# Patient Record
Sex: FEMALE | Race: WHITE | Employment: UNEMPLOYED | URBAN - METROPOLITAN AREA
[De-identification: names, ages, dates, MRNs, and addresses within clinical notes are randomized per-mention and may not be internally consistent; named-entity substitution may affect disease eponyms.]

---

## 2017-02-15 ENCOUNTER — HOSPITAL ENCOUNTER (OUTPATIENT)
Dept: LAB | Age: 52
Discharge: HOME | End: 2017-02-15

## 2017-02-15 ENCOUNTER — OFFICE VISIT (OUTPATIENT)
Dept: HEMATOLOGY | Age: 52
End: 2017-02-15

## 2017-02-15 VITALS
TEMPERATURE: 96.7 F | SYSTOLIC BLOOD PRESSURE: 131 MMHG | RESPIRATION RATE: 16 BRPM | DIASTOLIC BLOOD PRESSURE: 84 MMHG | WEIGHT: 167 LBS | OXYGEN SATURATION: 95 % | BODY MASS INDEX: 26.21 KG/M2 | HEIGHT: 67 IN | HEART RATE: 60 BPM

## 2017-02-15 DIAGNOSIS — B18.2 CHRONIC HEPATITIS C WITHOUT HEPATIC COMA (HCC): Primary | ICD-10-CM

## 2017-02-15 PROCEDURE — 99001 SPECIMEN HANDLING PT-LAB: CPT | Performed by: NURSE PRACTITIONER

## 2017-02-15 NOTE — MR AVS SNAPSHOT
Visit Information Date & Time Provider Department Dept. Phone Encounter #  
 2/15/2017 10:00 AM Kathy Moreira NP [facility] [address] [place] [address] [id number] Upcoming Health Maintenance Date Due Pneumococcal 19-64 Medium Risk (1 of 1 - PPSV23) 5/27/1984 DTaP/Tdap/Td series (1 - Tdap) 5/27/1986 PAP AKA CERVICAL CYTOLOGY 5/27/1986 BREAST CANCER SCRN MAMMOGRAM 5/27/2015 FOBT Q 1 YEAR AGE 50-75 5/27/2015 INFLUENZA AGE 9 TO ADULT 8/1/2016 Allergies as of 2/15/2017  Review Complete On: 2/15/2017 By: Mariya Connelly Severity Noted Reaction Type Reactions Biaxin [Clarithromycin] High 05/06/2016   Systemic Hives, Shortness of Breath, Itching Other Plant, Animal, Environmental High 08/05/2016    Anaphylaxis Bees and most insects-pt has an epipen Cleocin [Clindamycin Hcl]  02/03/2016    Palpitations Current Immunizations  Never Reviewed No immunizations on file. Not reviewed this visit You Were Diagnosed With   
  
 Codes Comments Chronic hepatitis C without hepatic coma (HCC)    -  Primary ICD-10-CM: B18.2 ICD-9-CM: 070.54 Vitals BP Pulse Temp Resp Height(growth percentile) 131/84 (BP 1 Location: Left arm, BP Patient Position: At rest) 60 96.7 °F (35.9 °C) (Tympanic) 16 5' 7\" (1.702 m) Weight(growth percentile) SpO2 BMI OB Status Smoking Status 167 lb (75.8 kg) 95% 26.16 kg/m2 Postmenopausal Current Every Day Smoker BMI and BSA Data Body Mass Index Body Surface Area  
 26.16 kg/m 2 1.89 m 2 Preferred Pharmacy Pharmacy Name Phone [facility] [phone] [phone] Your Updated Medication List  
  
   
This list is accurate as of: 2/15/17 10:35 AM.  Always use your most recent med list.  
  
  
  
  
 albuterol 90 mcg/actuation inhaler Commonly known as:  PROVENTIL HFA, VENTOLIN HFA, PROAIR HFA Take  by inhalation. ampicillin 500 mg capsule Commonly known as:  PRINCIPEN Take 500 mg by mouth two (2) times a day. Licens sclerosis  
  
 busPIRone 15 mg tablet Commonly known as:  BUSPAR Take 15 mg by mouth two (2) times a day. cyclobenzaprine 10 mg tablet Commonly known as:  FLEXERIL Take 1 Tab by mouth three (3) times daily as needed for Muscle Spasm(s). FLUoxetine 20 mg tablet Commonly known as:  PROzac Take 40 mg by mouth two (2) times a day. fluticasone 50 mcg/actuation nasal spray Commonly known as:  [place] 2 Sprays by Both Nostrils route once.  
  
 gabapentin 300 mg capsule Commonly known as:  NEURONTIN Take 900 mg by mouth three (3) times daily. ledipasvir-sofosbuvir  mg Tab Take 1 Tab by mouth daily. Hep C treatment  
  
 metoprolol tartrate 50 mg tablet Commonly known as:  LOPRESSOR Take 50 mg by mouth two (2) times a day. multivitamin tablet Commonly known as:  ONE A DAY Take 1 Tab by mouth daily. oxyCODONE-acetaminophen 5-325 mg per tablet Commonly known as:  PERCOCET Take 1-2 Tabs by mouth every four (4) hours as needed. Max Daily Amount: 12 Tabs. traZODone 150 mg tablet Commonly known as:  Comer Bump Take 150 mg by mouth nightly. To-Do List   
 02/15/2017 Lab:  HCV RNA BY DENIA HORANRFLX TO QT   
  
 02/15/2017 Lab:  HEPATIC FUNCTION PANEL   
  
 02/15/2017 Lab:  METABOLIC PANEL, BASIC Introducing [facility] HE[facility]! Stacy Talley introduces [email or web address] patient portal. Now you can access parts of your medical record, email your doctor's office, and request medication refills online. 1. In your internet browser, go to [email or web address] 2. Click on the First Time User? Click Here link in the Sign In box. You will see the New Member Sign Up page. 3. Enter your [email or web address] Access Code exactly as it appears below.  You will not need to use this code after youve completed the sign-up process. If you do not sign up before the expiration date, you must request a new code. · [employer] Access Code: [email or web address] Expires: 5/16/2017  9:59 AM 
 
4. Enter the last four digits of your Social Security Number (xxxx) and Date of Birth (mm/dd/yyyy) as indicated and click Submit. You will be taken to the next sign-up page. 5. Create a [employer] ID. This will be your [employer] login ID and cannot be changed, so think of one that is secure and easy to remember. 6. Create a [employer] password. You can change your password at any time. 7. Enter your Password Reset Question and Answer. This can be used at a later time if you forget your password. 8. Enter your e-mail address. You will receive e-mail notification when new information is available in [address] 9. Click Sign Up. You can now view and download portions of your medical record. 10. Click the Download Summary menu link to download a portable copy of your medical information. If you have questions, please visit the Frequently Asked Questions section of the [employer] website. Remember, [employer] is NOT to be used for urgent needs. For medical emergencies, dial 911. Now available from your iPhone and Android! Please provide this summary of care documentation to your next provider. Your primary care clinician is listed as Keke Guerra. If you have any questions after today's visit, please call [phone].

## 2017-02-15 NOTE — PROGRESS NOTES
93 Sammy Bocanegra MD, KENDELL Rodriguez PA-C Jonas Pigg, MD, Edward Latham MD     [address], NP     Meeta Wilson, KENDELL Santos [facility] 142, 100 [facility], [facility] 22.     [phone]     FAX: [address] [place] [address], [address] [place] [address], 300 [facility] - [address]     [phone]     FAX: [phone]           Patient Care Team:  Poonam White MD as PCP - General (Family Practice)  Dr Leonel Mondragon, [facility] PRactice        Problem List  Date Reviewed: 9/2/2016          Codes Class Noted    Cervical spinal stenosis ICD-10-CM: M48.02  ICD-9-CM: 723.0  8/15/2016        Cervical spondylosis ICD-10-CM: I05.921  ICD-9-CM: 721.0  8/15/2016        Radiculopathy affecting upper extremity ICD-10-CM: M54.10  ICD-9-CM: 723.4  8/15/2016        Chronic hepatitis C ([facility] [address] ICD-10-CM: B18.2  ICD-9-CM: 070.54  6/0/1756        Folliculitis D-83-VW: L73.9  ICD-9-CM: 704.8  2/3/2016        Depression ICD-10-CM: F32.9  ICD-9-CM: 655  2/3/2016        Insomnia ICD-10-CM: G47.00  ICD-9-CM: 780.52  2/3/2016        Hypertension ICD-10-CM: I10  ICD-9-CM: 401.9  2/3/2016        S/P surgical manipulation of ankle joint ICD-10-CM: Z98.890  ICD-9-CM: V45.89  2/3/2016        H/O arthroscopic knee surgery ICD-10-CM: Z98.890  ICD-9-CM: V45.89  2/3/2016        S/P carpal tunnel release ICD-10-CM: Z98.890  ICD-9-CM: V45.89  2/3/2016        H/O shoulder surgery ICD-10-CM: Z98.890  ICD-9-CM: V45.89  2/3/2016                Ms. Kristian Drake returns to the [address] regarding chronic HCV and its management. Her active problem list, all pertinent past medical history, medications, liver histology, endoscopic studies, radiologic findings and laboratory findings related to the liver disorder were reviewed with her.         The patient is a 46 y.o.  female who was noted to have abnormalities in liver chemistries and subsequently tested positive for chronic HCV in 2005. Risk factors for acquiring HCV are IV drug use inhaling cocaine in 1980s. There was no history of acute incteric hepatitis at the time of these risk factors. A liver biopsy was performed in May 2015 that was consistent chronic hepatitis with no fibrosis. Ms. Hailee Stubbs has not been seen in the office in nine months. Eli Farfan was ordered at that time and approved. She did not follow up during treatment as instructed but states that she completed treatment in October 2016. The patient has not experienced problems concentrating, swelling of the abdomen, swelling of the lower extremities, hematemesis, hematochezia. The patient limitations in functional activities which can be attributed to other medical problems that are not related to the liver disease. ALLERGIES  Allergies   Allergen Reactions    Biaxin [Clarithromycin] Hives, Shortness of Breath and Itching    Other Plant, Animal, Environmental Anaphylaxis     Bees and most insects-pt has an epipen    Cleocin [Clindamycin Hcl] Palpitations       MEDICATIONS  Current Outpatient Prescriptions   Medication Sig    cyclobenzaprine (FLEXERIL) 10 mg tablet Take 1 Tab by mouth three (3) times daily as needed for Muscle Spasm(s).  oxyCODONE-acetaminophen (PERCOCET) 5-325 mg per tablet Take 1-2 Tabs by mouth every four (4) hours as needed. Max Daily Amount: 12 Tabs.  ledipasvir-sofosbuvir  mg tab Take 1 Tab by mouth daily. Hep C treatment    fluticasone (FLONASE) 50 mcg/actuation nasal spray 2 Sprays by Both Nostrils route once.  albuterol (PROVENTIL HFA, VENTOLIN HFA, PROAIR HFA) 90 mcg/actuation inhaler Take  by inhalation.  metoprolol (LOPRESSOR) 50 mg tablet Take 50 mg by mouth two (2) times a day.  traZODone (DESYREL) 150 mg tablet Take 150 mg by mouth nightly.     ampicillin (PRINCIPEN) 500 mg capsule Take 500 mg by mouth two (2) times a day. Licens sclerosis    FLUoxetine (PROZAC) 20 mg tablet Take 40 mg by mouth two (2) times a day.  gabapentin (NEURONTIN) 300 mg capsule Take 900 mg by mouth three (3) times daily.  busPIRone (BUSPAR) 15 mg tablet Take 15 mg by mouth two (2) times a day.  multivitamin (ONE A DAY) tablet Take 1 Tab by mouth daily. No current facility-administered medications for this visit. SYSTEM REVIEW NOT RELATED TO LIVER DISEASE OR REVIEWED ABOVE:  Constitution systems: Negative for fever, chills, weight gain, weight loss. Eyes: Negative for visual changes. ENT: Negative for sore throat, painful swallowing. Respiratory: Negative for cough, hemoptysis, SOB. Cardiology: Negative for chest pain, palpitations. GI:  Frequent bowel movements. : Negative for urinary frequency, dysuria, hematuria, nocturia. Skin: Negative for rash. Hematology: Negative for easy bruising, blood clots. Musculo-skelatal: Negative for back pain, muscle pain, weakness. Neurologic: Negative for headaches, dizziness, vertigo, memory problems not related to HE. Psychology: Negative for anxiety, depression. FAMILY HISTORY:  The father has the following chronic diseases: DM. The mother has the following chronic diseases: HTN. There is no family history of liver disease. SOCIAL HISTORY:  The patient is . The patient has 2 children, and 2 grandchildren. The patient currently smokes 1 pack of tobacco daily. The patient has never consumed significant amounts of alcohol. The patient is currently receiving disability. PHYSICAL EXAMINATION:  Visit Vitals    /84 (BP 1 Location: Left arm, BP Patient Position: At rest)    Pulse 60    Temp 96.7 °F (35.9 °C) (Tympanic)    Resp 16    Ht 5' 7\" (1.702 m)    Wt 167 lb (75.8 kg)    SpO2 95%    BMI 26.16 kg/m2     General: No acute distress. Eyes: Sclera anicteric. ENT: No oral lesions. Thyroid normal.  Nodes: No adenopathy. Skin: No spider angiomata. No jaundice. No palmar erythema. Respiratory: Lungs clear to auscultation. Cardiovascular: Regular heart rate. No murmurs. No JVD. Abdomen: Soft non-tender. Liver size normal to percussion/palpation. Spleen not palpable. No obvious ascites. Extremities: No edema. No muscle wasting. No gross arthritic changes. Neurologic: Alert and oriented. Cranial nerves grossly intact. No asterixis. LABORATORY STUDIES:  [facility] [place] [address] 2/3/2016   WBC 3.4 - 10.8 x10E3/uL 3.1 (L)   ANC 1.4 - 7.0 x10E3/uL 1.0 (L)   HGB 11.1 - 15.9 g/dL 14.1    - 379 x10E3/uL 462 (H)   INR  CANCELED   AST 0 - 40 IU/L 30   ALT 0 - 32 IU/L 29   Alk Phos 39 - 117 IU/L 84   Bili, Total 0.0 - 1.2 mg/dL 0.3   Bili, Direct 0.00 - 0.40 mg/dL 0.09   Albumin 3.5 - 5.5 g/dL 4.3   BUN 6 - 24 mg/dL 20   Creat 0.57 - 1.00 mg/dL 0.90   Na 134 - 144 mmol/L 139   K 3.5 - 5.2 mmol/L 5.1   Cl 97 - 108 mmol/L 103   CO2 18 - 29 mmol/L 20   Glucose 65 - 99 mg/dL 94     SEROLOGIES:  10/2015. Anti-HIV negative, anti-HCV positive    Serologies Latest Ref Rng 2/3/2016   Hep A Ab, Total Negative Positive (A)   Hep B Surface Ag Negative Negative   Hep B Surface AB QL  Reactive   Hep C Genotype  1a   HCV RT-PCR, Quant  [id number]     LIVER HISTOLOGY:  5/2016:  Knodell score (1,1,1,0). Isma fibrosis 0. ENDOSCOPIC PROCEDURES:  Not available or performed    RADIOLOGY:  Not available or performed    OTHER TESTING:  Not available or performed    ASSESSMENT AND PLAN:    1. Chronic HCV, genotype 1 A in the setting of no fibrosis. 2. HCV treatment. She completed 12 weeks of treatment with Harvoni in October 2016 per patient. CBC, BMP, hepatic panel and HCV RNA ordered today. 3. The patient was counseled regarding alcohol consumption. 4. Vaccination for viral hepatitis A and B is not needed.   The patient has serologic evidence of prior exposure or vaccination with immunity. 5. All of the above issues were discussed with the patient. All questions were answered. The patient expressed a clear understanding of the above. [address] is not needed if HCV RNA is negative.      Shirlene Walsh NP  Liver [place] [address], [address]  [facility], [address] [place] [address]  [phone]

## 2017-02-20 LAB
ALBUMIN SERPL-MCNC: 4.1 G/DL (ref 3.5–5.5)
ALP SERPL-CCNC: 90 IU/L (ref 39–117)
ALT SERPL-CCNC: 15 IU/L (ref 0–32)
AST SERPL-CCNC: 16 IU/L (ref 0–40)
BILIRUB DIRECT SERPL-MCNC: 0.08 MG/DL (ref 0–0.4)
BILIRUB SERPL-MCNC: 0.2 MG/DL (ref 0–1.2)
BUN SERPL-MCNC: 12 MG/DL (ref 6–24)
BUN/CREAT SERPL: 17 (ref 9–23)
CALCIUM SERPL-MCNC: 8.9 MG/DL (ref 8.7–10.2)
CHLORIDE SERPL-SCNC: 99 MMOL/L (ref 96–106)
CO2 SERPL-SCNC: 23 MMOL/L (ref 18–29)
CREAT SERPL-MCNC: 0.71 MG/DL (ref 0.57–1)
GLUCOSE SERPL-MCNC: 87 MG/DL (ref 65–99)
HCV RNA SERPL QL NAA+PROBE: NEGATIVE
POTASSIUM SERPL-SCNC: 4.7 MMOL/L (ref 3.5–5.2)
PROT SERPL-MCNC: 6.6 G/DL (ref 6–8.5)
SODIUM SERPL-SCNC: 136 MMOL/L (ref 134–144)

## 2017-08-31 ENCOUNTER — HOSPITAL ENCOUNTER (OUTPATIENT)
Dept: PREADMISSION TESTING | Age: 52
Discharge: HOME | End: 2017-08-31
Payer: MEDICARE

## 2017-08-31 VITALS — BODY MASS INDEX: 27.32 KG/M2 | WEIGHT: 170 LBS | HEIGHT: 66 IN

## 2017-08-31 LAB
ANION GAP SERPL CALC-SCNC: 9 MMOL/L (ref 3–18)
ATRIAL RATE: 59 BPM
BUN SERPL-MCNC: 14 MG/DL (ref 7–18)
BUN/CREAT SERPL: 13 (ref 12–20)
CALCIUM SERPL-MCNC: 8.1 MG/DL (ref 8.5–10.1)
CALCULATED P AXIS, ECG09: 23 DEGREES
CALCULATED R AXIS, ECG10: 14 DEGREES
CALCULATED T AXIS, ECG11: 34 DEGREES
CHLORIDE SERPL-SCNC: 107 MMOL/L (ref 100–108)
CO2 SERPL-SCNC: 25 MMOL/L (ref 21–32)
CREAT SERPL-MCNC: 1.07 MG/DL (ref 0.6–1.3)
DIAGNOSIS, 93000: NORMAL
ERYTHROCYTE [DISTWIDTH] IN BLOOD BY AUTOMATED COUNT: 13.9 % (ref 11.6–14.5)
GLUCOSE SERPL-MCNC: 96 MG/DL (ref 74–99)
HCT VFR BLD AUTO: 38.8 % (ref 35–45)
HGB BLD-MCNC: 12.8 G/DL (ref 12–16)
MCH RBC QN AUTO: 28.8 PG (ref 24–34)
MCHC RBC AUTO-ENTMCNC: 33 G/DL (ref 31–37)
MCV RBC AUTO: 87.4 FL (ref 74–97)
P-R INTERVAL, ECG05: 148 MS
PLATELET # BLD AUTO: 281 K/UL (ref 135–420)
PMV BLD AUTO: 10.1 FL (ref 9.2–11.8)
POTASSIUM SERPL-SCNC: 4.8 MMOL/L (ref 3.5–5.5)
Q-T INTERVAL, ECG07: 448 MS
QRS DURATION, ECG06: 86 MS
QTC CALCULATION (BEZET), ECG08: 443 MS
RBC # BLD AUTO: 4.44 M/UL (ref 4.2–5.3)
SODIUM SERPL-SCNC: 141 MMOL/L (ref 136–145)
VENTRICULAR RATE, ECG03: 59 BPM
WBC # BLD AUTO: 6.4 K/UL (ref 4.6–13.2)

## 2017-08-31 PROCEDURE — 80048 BASIC METABOLIC PNL TOTAL CA: CPT | Performed by: ORTHOPAEDIC SURGERY

## 2017-08-31 PROCEDURE — 85027 COMPLETE CBC AUTOMATED: CPT | Performed by: ORTHOPAEDIC SURGERY

## 2017-08-31 PROCEDURE — 93005 ELECTROCARDIOGRAM TRACING: CPT

## 2017-08-31 RX ORDER — SODIUM CHLORIDE, SODIUM LACTATE, POTASSIUM CHLORIDE, CALCIUM CHLORIDE 600; 310; 30; 20 MG/100ML; MG/100ML; MG/100ML; MG/100ML
125 INJECTION, SOLUTION INTRAVENOUS CONTINUOUS
Status: CANCELLED | OUTPATIENT
Start: 2017-08-31

## 2017-09-13 PROBLEM — F43.10 PTSD (POST-TRAUMATIC STRESS DISORDER): Chronic | Status: ACTIVE | Noted: 2017-09-13

## 2017-09-13 PROBLEM — B19.20 HEPATITIS C: Chronic | Status: ACTIVE | Noted: 2017-09-13

## 2017-09-13 PROBLEM — G56.02 CARPAL TUNNEL SYNDROME OF LEFT WRIST: Chronic | Status: ACTIVE | Noted: 2017-09-13

## 2017-09-13 PROBLEM — L90.0 LICHEN SCLEROSUS: Chronic | Status: ACTIVE | Noted: 2017-09-13

## 2018-05-23 ENCOUNTER — HOSPITAL ENCOUNTER (OUTPATIENT)
Dept: PREADMISSION TESTING | Age: 53
Discharge: HOME | End: 2018-05-23

## 2018-05-23 VITALS — WEIGHT: 161 LBS | BODY MASS INDEX: 24.4 KG/M2 | HEIGHT: 68 IN

## 2018-05-23 RX ORDER — EPINEPHRINE 0.3 MG/.3ML
INJECTION SUBCUTANEOUS
COMMUNITY
Start: 2018-03-06

## 2018-05-23 RX ORDER — BUPROPION HYDROCHLORIDE 150 MG/1
150 TABLET, EXTENDED RELEASE ORAL
COMMUNITY
Start: 2018-03-06 | End: 2018-09-02

## 2018-05-23 RX ORDER — CARBAMAZEPINE 200 MG/1
200 TABLET ORAL
COMMUNITY
Start: 2018-05-17 | End: 2019-05-17

## 2018-05-23 RX ORDER — LOPERAMIDE HYDROCHLORIDE AND SIMETHICONE 2; 125 MG/1; MG/1
1 TABLET ORAL
Status: ON HOLD | COMMUNITY
Start: 2016-07-26 | End: 2018-05-31

## 2018-05-23 RX ORDER — IBUPROFEN 800 MG/1
800 TABLET ORAL
COMMUNITY
Start: 2018-03-06

## 2018-05-29 NOTE — H&P
History and Physical        Patient: Doris Almeida               Sex: female          DOA: (Not on file)         YOB: 1965      Age:  48 y.o.        LOS:  LOS: 0 days        HPI:     Itz Bueno is in for followup of her left carpal tunnel syndrome/bilateral severe 1st [facility] DJD. Dr. Trae Laura has seen her previously for right hip pain as noted in his las note of 08/28/17. The patient is mainly in today because she had some life issues that she has dealt with for now. These are sort of settled now and she is back to baseline and she is ready to proceed with left carpal tunnel release. She is still having numbness in the median nerve distribution of her digits that is there night and day. She says it does not really wake her up at night as much anymore and bothers her more when she drops things because she feels like she is losing strength. AP, lateral, and oblique views of the hands were obtained and interpreted in the office and  show severe 1st Aia 16 DJD bilaterally. Otherwise, x-rays reveal no periosteal reaction, no medullary lesions, no osteopenia, well-aligned joint spaces, no chondrolysis, and no fractures.   Past Medical History:   Diagnosis Date    Arthritis     Asthma     Bronchitis     Cancer ([address]     palate oral    Chronic pain     neck and bilateral shoulders, hands and wrists, hips, lumbar, left knee and right ankle    Concussion     march 2018    GERD (gastroesophageal reflux disease)     Hypertension 2014    pt states resolved 2018    IBS (irritable bowel syndrome)     Lichen sclerosus     Lichen sclerosus     Liver disease     hepatitis C resolved per pt    Postmenopausal     Psychiatric disorder     depression and anxiety    Trigeminal neuralgia        Past Surgical History:   Procedure Laterality Date    COLONOSCOPY N/A 7/12/2016    COLONOSCOPY; POLYPECTOMY performed by Clifford Willett MD at [place] [address]  HX HEENT      oral surgery x 2    HX KNEE ARTHROSCOPY Left 1990's    HX ORTHOPAEDIC  1980's    Right ankle surgery    HX ORTHOPAEDIC Right     thumb    HX ORTHOPAEDIC Right     CTR    HX SHOULDER ARTHROSCOPY Right 2001    HX TUBAL LIGATION  1998       No family history on file. Social History     Social History    Marital status:      Spouse name: N/A    Number of children: N/A    Years of education: N/A     Social History Main Topics    Smoking status: Current Every Day Smoker     Packs/day: 0.25     Years: 43.00     Types: Cigarettes    Smokeless tobacco: Never Used      Comment: advised to hold tobacco 24 hours prior to surgery    Alcohol use No    Drug use: No    Sexual activity: Not Currently     Other Topics Concern    Not on file     Social History Narrative       Prior to Admission medications    Medication Sig Start Date End Date Taking? Authorizing Provider   buPROPion SR [facility] SR) 150 mg SR tablet Take 150 mg by mouth. 3/6/18 9/2/18  Historical Provider   carBAMazepine (TEGRETOL) 200 mg tablet Take 200 mg by mouth. Indications: TRIGEMINAL NEURALGIA 5/17/18 5/17/19  Historical Provider   ibuprofen (MOTRIN) 800 mg tablet Take 800 mg by mouth. 3/6/18   Historical Provider   loperamide-simethicone 2-125 mg tab Take 1 Tab by mouth. 7/26/16   Historical Provider   EPINEPHrine (EPIPEN 2-ANALI) 0.3 mg/0.3 mL injection Inject 0.3 mg sc or IM x2 may repeat x 1 3/6/18   Historical Provider   fluticasone (FLONASE) 50 mcg/actuation nasal spray 2 Sprays by Both Nostrils route daily. Historical Provider   albuterol (PROVENTIL HFA, VENTOLIN HFA, PROAIR HFA) 90 mcg/actuation inhaler Take 2 Puffs by inhalation every four (4) hours as needed. Historical Provider   traZODone (DESYREL) 150 mg tablet Take 150 mg by mouth nightly. 1/20/16   Historical Provider   FLUoxetine (PROZAC) 20 mg tablet Take 20 mg by mouth two (2) times a day.  1/20/16   Historical Provider   gabapentin (NEURONTIN) 300 mg capsule Take 300 mg by mouth three (3) times daily. 1/20/16   Historical Provider   busPIRone (BUSPAR) 15 mg tablet Take 15 mg by mouth two (2) times a day. 1/20/16   Historical Provider   multivitamin (ONE A DAY) tablet Take 1 Tab by mouth daily. Historical Provider       Allergies   Allergen Reactions    Biaxin [Clarithromycin] Hives, Shortness of Breath and Itching    Other Plant, Animal, Environmental Anaphylaxis     Bees and most insects-pt has an epipen    Cleocin [Clindamycin Hcl] Palpitations       Review of Systems  A comprehensive review of systems was negative except for that written in the History of Present Illness. Physical Exam:      There were no vitals taken for this visit. Physical Exam:  On physical exam she is a healthy appearing middle aged  female. The left hand has decreased light touch sensation in the median nerve distribution. She has a mild adduction deformity of the 1st [facility] joint and mild grind on exam at the 1st [facility]. Physical examination shows that the patients left wrist demonstrates no obvious swelling, ecchymosis, or wounds noted. There is no tenderness to palpation anywhere within the wrist or hand. The patient has normal motion in all six directions. The patient has a negative Tinel, Phalen, and direct carpal compression tests. The patient has a negative Finkelstein maneuver. The patient has good capillary refill. The patient has good  strength of the hand with normal thenar eminence tone and normal light-touch sensation at the tip of each digit. Assessment/Plan     Principal Problem:    Carpal tunnel syndrome of left wrist (9/13/2017)    Active Problems:    Depression (2/3/2016)      Insomnia (2/3/2016)      Hypertension (8/3/1383)      Lichen sclerosus (6/45/7998)      PTSD (post-traumatic stress disorder) (9/13/2017)      Hepatitis C (9/13/2017)      Dr. Marcus Urbano has gone ahead and scheduled her for a left ECTR.   He has given her ten Norco pills for postoperative use. He told her that the nerve damage will hopefully start to resolve after endoscopic release but it is hard to  whether this will return to normalcy or not. She was having some complaints of dropping objects bilaterally but this could be from arthritis of the base of the thumb as well. He will see her again one week postop and we will progress her accordingly. She had some complaints of some left shoulder pain and we will see if this could be related to the carpal tunnel itself.

## 2018-05-30 ENCOUNTER — ANESTHESIA EVENT (OUTPATIENT)
Dept: SURGERY | Age: 53
End: 2018-05-30
Payer: MEDICARE

## 2018-05-30 RX ORDER — SODIUM CHLORIDE 0.9 % (FLUSH) 0.9 %
5-10 SYRINGE (ML) INJECTION EVERY 8 HOURS
Status: CANCELLED | OUTPATIENT
Start: 2018-05-30

## 2018-05-30 RX ORDER — SODIUM CHLORIDE 0.9 % (FLUSH) 0.9 %
5-10 SYRINGE (ML) INJECTION AS NEEDED
Status: CANCELLED | OUTPATIENT
Start: 2018-05-30

## 2018-05-30 RX ORDER — DIPHENHYDRAMINE HCL 25 MG
25 CAPSULE ORAL
Status: CANCELLED | OUTPATIENT
Start: 2018-05-30

## 2018-05-31 ENCOUNTER — HOSPITAL ENCOUNTER (OUTPATIENT)
Age: 53
Setting detail: OUTPATIENT SURGERY
Discharge: HOME | End: 2018-05-31
Attending: ORTHOPAEDIC SURGERY | Admitting: ORTHOPAEDIC SURGERY
Payer: MEDICARE

## 2018-05-31 ENCOUNTER — ANESTHESIA (OUTPATIENT)
Dept: SURGERY | Age: 53
End: 2018-05-31
Payer: MEDICARE

## 2018-05-31 VITALS
HEART RATE: 71 BPM | TEMPERATURE: 98 F | BODY MASS INDEX: 23.83 KG/M2 | SYSTOLIC BLOOD PRESSURE: 123 MMHG | WEIGHT: 157.25 LBS | RESPIRATION RATE: 16 BRPM | OXYGEN SATURATION: 95 % | HEIGHT: 68 IN | DIASTOLIC BLOOD PRESSURE: 81 MMHG

## 2018-05-31 LAB
HCG SERPL QL: NEGATIVE
HCG SERPL-ACNC: 4 MIU/ML (ref 1–6)

## 2018-05-31 PROCEDURE — 77030012508 HC MSK AIRWY LMA AMBU -A: Performed by: ANESTHESIOLOGY

## 2018-05-31 PROCEDURE — 84703 CHORIONIC GONADOTROPIN ASSAY: CPT | Performed by: ORTHOPAEDIC SURGERY

## 2018-05-31 PROCEDURE — 74011000250 HC RX REV CODE- 250: Performed by: ORTHOPAEDIC SURGERY

## 2018-05-31 PROCEDURE — 76210000006 HC OR PH I REC 0.5 TO 1 HR: Performed by: ORTHOPAEDIC SURGERY

## 2018-05-31 PROCEDURE — 74011250637 HC RX REV CODE- 250/637: Performed by: ANESTHESIOLOGY

## 2018-05-31 PROCEDURE — 74011250636 HC RX REV CODE- 250/636

## 2018-05-31 PROCEDURE — 74011250636 HC RX REV CODE- 250/636: Performed by: ORTHOPAEDIC SURGERY

## 2018-05-31 PROCEDURE — 84702 CHORIONIC GONADOTROPIN TEST: CPT | Performed by: ANESTHESIOLOGY

## 2018-05-31 PROCEDURE — 76010000138 HC OR TIME 0.5 TO 1 HR: Performed by: ORTHOPAEDIC SURGERY

## 2018-05-31 PROCEDURE — 77030020782 HC GWN BAIR PAWS FLX 3M -B: Performed by: ORTHOPAEDIC SURGERY

## 2018-05-31 PROCEDURE — 77030009770 HC INSTRU CRPL SET CNMD -C: Performed by: ORTHOPAEDIC SURGERY

## 2018-05-31 PROCEDURE — 77030011640 HC PAD GRND REM COVD -A: Performed by: ORTHOPAEDIC SURGERY

## 2018-05-31 PROCEDURE — 74011250636 HC RX REV CODE- 250/636: Performed by: ANESTHESIOLOGY

## 2018-05-31 PROCEDURE — 74011000250 HC RX REV CODE- 250

## 2018-05-31 PROCEDURE — 76210000026 HC REC RM PH II 1 TO 1.5 HR: Performed by: ORTHOPAEDIC SURGERY

## 2018-05-31 PROCEDURE — 74011250636 HC RX REV CODE- 250/636: Performed by: PHYSICIAN ASSISTANT

## 2018-05-31 PROCEDURE — 36415 COLL VENOUS BLD VENIPUNCTURE: CPT | Performed by: ANESTHESIOLOGY

## 2018-05-31 PROCEDURE — 77030000032 HC CUF TRNQT ZIMM -B: Performed by: ORTHOPAEDIC SURGERY

## 2018-05-31 PROCEDURE — 76060000032 HC ANESTHESIA 0.5 TO 1 HR: Performed by: ORTHOPAEDIC SURGERY

## 2018-05-31 RX ORDER — FLUMAZENIL 0.1 MG/ML
0.2 INJECTION INTRAVENOUS
Status: DISCONTINUED | OUTPATIENT
Start: 2018-05-31 | End: 2018-05-31 | Stop reason: HOSPADM

## 2018-05-31 RX ORDER — GLYCOPYRROLATE 0.2 MG/ML
INJECTION INTRAMUSCULAR; INTRAVENOUS AS NEEDED
Status: DISCONTINUED | OUTPATIENT
Start: 2018-05-31 | End: 2018-05-31 | Stop reason: HOSPADM

## 2018-05-31 RX ORDER — FENTANYL CITRATE 50 UG/ML
INJECTION, SOLUTION INTRAMUSCULAR; INTRAVENOUS AS NEEDED
Status: DISCONTINUED | OUTPATIENT
Start: 2018-05-31 | End: 2018-05-31 | Stop reason: HOSPADM

## 2018-05-31 RX ORDER — ONDANSETRON 2 MG/ML
INJECTION INTRAMUSCULAR; INTRAVENOUS AS NEEDED
Status: DISCONTINUED | OUTPATIENT
Start: 2018-05-31 | End: 2018-05-31 | Stop reason: HOSPADM

## 2018-05-31 RX ORDER — NALOXONE HYDROCHLORIDE 0.4 MG/ML
0.1 INJECTION, SOLUTION INTRAMUSCULAR; INTRAVENOUS; SUBCUTANEOUS AS NEEDED
Status: DISCONTINUED | OUTPATIENT
Start: 2018-05-31 | End: 2018-05-31 | Stop reason: HOSPADM

## 2018-05-31 RX ORDER — LIDOCAINE HYDROCHLORIDE 20 MG/ML
INJECTION, SOLUTION EPIDURAL; INFILTRATION; INTRACAUDAL; PERINEURAL AS NEEDED
Status: DISCONTINUED | OUTPATIENT
Start: 2018-05-31 | End: 2018-05-31 | Stop reason: HOSPADM

## 2018-05-31 RX ORDER — DEXTROSE 50 % IN WATER (D50W) INTRAVENOUS SYRINGE
25-50 AS NEEDED
Status: DISCONTINUED | OUTPATIENT
Start: 2018-05-31 | End: 2018-05-31 | Stop reason: HOSPADM

## 2018-05-31 RX ORDER — SODIUM CHLORIDE 0.9 % (FLUSH) 0.9 %
5-10 SYRINGE (ML) INJECTION AS NEEDED
Status: DISCONTINUED | OUTPATIENT
Start: 2018-05-31 | End: 2018-05-31 | Stop reason: HOSPADM

## 2018-05-31 RX ORDER — PROPOFOL 10 MG/ML
INJECTION, EMULSION INTRAVENOUS AS NEEDED
Status: DISCONTINUED | OUTPATIENT
Start: 2018-05-31 | End: 2018-05-31 | Stop reason: HOSPADM

## 2018-05-31 RX ORDER — SODIUM CHLORIDE, SODIUM LACTATE, POTASSIUM CHLORIDE, CALCIUM CHLORIDE 600; 310; 30; 20 MG/100ML; MG/100ML; MG/100ML; MG/100ML
125 INJECTION, SOLUTION INTRAVENOUS CONTINUOUS
Status: DISCONTINUED | OUTPATIENT
Start: 2018-05-31 | End: 2018-05-31 | Stop reason: HOSPADM

## 2018-05-31 RX ORDER — OXYCODONE HYDROCHLORIDE 5 MG/1
5 TABLET ORAL ONCE
Status: COMPLETED | OUTPATIENT
Start: 2018-05-31 | End: 2018-05-31

## 2018-05-31 RX ORDER — MIDAZOLAM HYDROCHLORIDE 1 MG/ML
INJECTION, SOLUTION INTRAMUSCULAR; INTRAVENOUS AS NEEDED
Status: DISCONTINUED | OUTPATIENT
Start: 2018-05-31 | End: 2018-05-31 | Stop reason: HOSPADM

## 2018-05-31 RX ORDER — FENTANYL CITRATE 50 UG/ML
50 INJECTION, SOLUTION INTRAMUSCULAR; INTRAVENOUS
Status: DISCONTINUED | OUTPATIENT
Start: 2018-05-31 | End: 2018-05-31 | Stop reason: HOSPADM

## 2018-05-31 RX ORDER — SODIUM CHLORIDE, SODIUM LACTATE, POTASSIUM CHLORIDE, CALCIUM CHLORIDE 600; 310; 30; 20 MG/100ML; MG/100ML; MG/100ML; MG/100ML
1000 INJECTION, SOLUTION INTRAVENOUS CONTINUOUS
Status: DISCONTINUED | OUTPATIENT
Start: 2018-05-31 | End: 2018-05-31 | Stop reason: HOSPADM

## 2018-05-31 RX ORDER — INSULIN LISPRO 100 [IU]/ML
INJECTION, SOLUTION INTRAVENOUS; SUBCUTANEOUS ONCE
Status: DISCONTINUED | OUTPATIENT
Start: 2018-05-31 | End: 2018-05-31 | Stop reason: HOSPADM

## 2018-05-31 RX ORDER — MAGNESIUM SULFATE 100 %
4 CRYSTALS MISCELLANEOUS AS NEEDED
Status: DISCONTINUED | OUTPATIENT
Start: 2018-05-31 | End: 2018-05-31 | Stop reason: HOSPADM

## 2018-05-31 RX ADMIN — SODIUM CHLORIDE, SODIUM LACTATE, POTASSIUM CHLORIDE, AND CALCIUM CHLORIDE 125 ML/HR: 600; 310; 30; 20 INJECTION, SOLUTION INTRAVENOUS at 10:16

## 2018-05-31 RX ADMIN — ONDANSETRON 4 MG: 2 INJECTION INTRAMUSCULAR; INTRAVENOUS at 11:58

## 2018-05-31 RX ADMIN — FENTANYL CITRATE 50 MCG: 50 INJECTION, SOLUTION INTRAMUSCULAR; INTRAVENOUS at 12:46

## 2018-05-31 RX ADMIN — LIDOCAINE HYDROCHLORIDE 60 MG: 20 INJECTION, SOLUTION EPIDURAL; INFILTRATION; INTRACAUDAL; PERINEURAL at 12:01

## 2018-05-31 RX ADMIN — OXYCODONE HYDROCHLORIDE 5 MG: 5 TABLET ORAL at 10:42

## 2018-05-31 RX ADMIN — FENTANYL CITRATE 50 MCG: 50 INJECTION, SOLUTION INTRAMUSCULAR; INTRAVENOUS at 12:01

## 2018-05-31 RX ADMIN — SODIUM CHLORIDE, SODIUM LACTATE, POTASSIUM CHLORIDE, AND CALCIUM CHLORIDE: 600; 310; 30; 20 INJECTION, SOLUTION INTRAVENOUS at 12:19

## 2018-05-31 RX ADMIN — PROPOFOL 150 MG: 10 INJECTION, EMULSION INTRAVENOUS at 12:01

## 2018-05-31 RX ADMIN — MIDAZOLAM HYDROCHLORIDE 2 MG: 1 INJECTION, SOLUTION INTRAMUSCULAR; INTRAVENOUS at 11:55

## 2018-05-31 RX ADMIN — FENTANYL CITRATE 25 MCG: 50 INJECTION, SOLUTION INTRAMUSCULAR; INTRAVENOUS at 12:20

## 2018-05-31 RX ADMIN — PROPOFOL 50 MG: 10 INJECTION, EMULSION INTRAVENOUS at 12:02

## 2018-05-31 RX ADMIN — GLYCOPYRROLATE 0.2 MG: 0.2 INJECTION INTRAMUSCULAR; INTRAVENOUS at 11:58

## 2018-05-31 RX ADMIN — FENTANYL CITRATE 25 MCG: 50 INJECTION, SOLUTION INTRAMUSCULAR; INTRAVENOUS at 12:21

## 2018-05-31 NOTE — ANESTHESIA PREPROCEDURE EVALUATION
Anesthetic History   No history of anesthetic complications            Review of Systems / Medical History  Patient summary reviewed, nursing notes reviewed and pertinent labs reviewed    Pulmonary            Asthma : well controlled       Neuro/Psych         Psychiatric history     Cardiovascular    Hypertension              Exercise tolerance: >4 METS     GI/Hepatic/Renal     GERD: well controlled      Liver disease     Endo/Other  Within defined limits           Other Findings   Comments: Patient has not had intercourse in 3 years and says she could not be pregnant. Quantitative is 4 and 0 to 6 is negative.  Pt okay with rolling         Physical Exam    Airway  Mallampati: II  TM Distance: 4 - 6 cm  Neck ROM: normal range of motion   Mouth opening: Normal     Cardiovascular    Rhythm: regular  Rate: normal         Dental    Dentition: Upper partial plate     Pulmonary  Breath sounds clear to auscultation               Abdominal  GI exam deferred       Other Findings            Anesthetic Plan    ASA: 3  Anesthesia type: general          Induction: Intravenous  Anesthetic plan and risks discussed with: Patient

## 2018-05-31 NOTE — ANESTHESIA POSTPROCEDURE EVALUATION
Post-Anesthesia Evaluation & Assessment    Visit Vitals    /76    Pulse 71    Temp 37.6 °C (99.6 °F)    Resp 16    Ht 5' 7.5\" (1.715 m)    Wt 71.3 kg (157 lb 4 oz)    SpO2 96%    BMI 24.27 kg/m2       No untreated/active PONV    Post-operative hydration adequate. Adequate post-operative analgesia per PACU discharge criteria    Mental status & level of consciousness: alert and oriented x 3    Respiratory status: patent unassisted airway     No apparent anesthetic complications requiring additional post-anesthetic care    Patient has met all discharge requirements.             Faizan Angelo MD

## 2018-05-31 NOTE — IP AVS SNAPSHOT
[address] [facility] 
 
 
 [address] [facility] [address] 
[phone] Patient: Roberto Carlos Alarcon MRN: [id number] :[id number] About your hospitalization You were admitted on:  May 31, 2018 [facility] You were discharged on:  May 31, 2018 Why you were hospitalized Your primary diagnosis was:  Carpal Tunnel Syndrome Of Left Wrist  
 Your diagnoses also included:  Depression, Hepatitis C, Hypertension, Insomnia, Lichen Sclerosus, Ptsd (Post-Traumatic Stress Disorder) Follow-up Information Follow up With Details Comments Contact Info Sienna Mullen, [address] [place] [facility] [address] [facility] 
[phone] Shira Verde MD Go in 10 day(s)  [address] [place] [address] 
[phone] Discharge Orders None A check henrique indicates which time of day the medication should be taken. My Medications CONTINUE taking these medications Instructions Each Dose to Equal  
 Morning Noon Evening Bedtime  
 albuterol 90 mcg/actuation inhaler Commonly known as:  PROVENTIL HFA, VENTOLIN HFA, PROAIR HFA Your last dose was: Your next dose is: Take 2 Puffs by inhalation every four (4) hours as needed. 2 Puff  
    
   
   
   
  
 buPROPion  mg SR tablet Commonly known as:  Joann Buddy Your last dose was: Your next dose is: Take 150 mg by mouth. 150 mg  
    
   
   
   
  
 busPIRone 15 mg tablet Commonly known as:  BUSPAR Your last dose was: Your next dose is: Take 15 mg by mouth two (2) times a day. 15 mg  
    
   
   
   
  
 carBAMazepine 200 mg tablet Commonly known as:  TEGretol Your last dose was: Your next dose is: Take 200 mg by mouth.  Indications: TRIGEMINAL NEURALGIA  
 200 mg  
    
   
   
   
  
 EPIPEN 2-ANALI 0.3 mg/0.3 mL injection Generic drug:  EPINEPHrine Your last dose was: Your next dose is:    
   
   
 Inject 0.3 mg sc or IM x2 may repeat x 1 FLUoxetine 20 mg tablet Commonly known as:  PROzac Your last dose was: Your next dose is: Take 20 mg by mouth two (2) times a day. 20 mg  
    
   
   
   
  
 fluticasone 50 mcg/actuation nasal spray Commonly known as:  Arlys Pock Your last dose was: Your next dose is: 2 Sprays by Both Nostrils route daily. 2 Spray  
    
   
   
   
  
 gabapentin 300 mg capsule Commonly known as:  NEURONTIN Your last dose was: Your next dose is: Take 300 mg by mouth three (3) times daily. 300 mg  
    
   
   
   
  
 ibuprofen 800 mg tablet Commonly known as:  MOTRIN Your last dose was: Your next dose is: Take 800 mg by mouth. 800 mg  
    
   
   
   
  
 multivitamin tablet Commonly known as:  ONE A DAY Your last dose was: Your next dose is: Take 1 Tab by mouth daily. 1 Tab  
    
   
   
   
  
 traZODone 150 mg tablet Commonly known as:  Illene Dus Your last dose was: Your next dose is: Take 150 mg by mouth nightly. 150 mg Discharge Instructions Balbina Scott III, MD Clemens Jubilee, PA-C Upper Extremity Surgery Discharge Instructions Please take the time to review the following instructions before you leave the hospital and use them as guidelines during your recovery from surgery. If you have any questions you may contact my office at [phone]. Wound Care/Dressing Changes: 
 
[]   You may remove the bulky dressing two days after surgery. Once you remove this, no dressing is necessary if there is no drainage. [x]   You may change your dressing as needed.   Beginning the 2 days after you are discharged from the hospital you should change your dressing daily. A big, bulky dressing isnt necessary as long as there is any drainage from the incisions. You can put a band-aid or a piece of gauze over each incision and wear an ACE bandage as needed for comfort and swelling. []   Dont remove your dressing or get them wet. ? It isnt necessary to apply antibiotic ointment to your incisions. Sutures will be removed at your one week post-op visit. Staples (if you have them) are removed in two weeks. If you have steri-strips over your incision they will start to peel off in 7-10 days as you get them wet. They dont need to be removed prior to that. When they begin to peel off, you may remove them. They should all be removed by 14 days from your surgery. Showering/Bathing: 
 
[x]   You may shower 2 days after your surgery. Your dressing may be removed for showering. You may get your incisions wet in the shower. Dont vigorously scrub your incisions. Apply a clean, dry dressing after you have dried your incisions. Do not take a bath or get into a swimming pool or Jacuzzi until the incisions are completely healed. This may take about 14 days. Do not soak your incision under water. Sling: 
 
[]   You are not required to wear your sling and should do so only as needed for comfort. You have no restrictions with regards to the movement of your shoulder. Please push to achieve full range of motion as soon as possible. You may resume your normal daily activities immediately and return to work as soon as you feel appropriate. 
 
[]   Keep your arm in the immobilizer at all times except when showering and changing your clothes. When showering or changing, keep your arm at your side. Dont move it away from your body.  
 
[]   Keep your arm in the immobilizer at all times except when showering, changing your clothes and doing the exercises shown to you by Dr. Tunde Iyer or your physical therapist prior to your discharge from the hospital.  Keep your arm at your side when changing your clothes and showering. Dont move it away from your body. Ice/Elevation Continue ice consistently for 48 hours after surgery. After 48 hours, you should ice your shoulder 3 times per day, for 20 minutes at a time for the next 5 days. After one week from surgery, you may use ice as needed for pain and swelling. Diet: 
 
You may advance to your regular diet as tolerated. Medication: 
 
1. You will be given a prescription for pain medication when you are discharged from the hospital.  Take the medication as needed according to the directions on the prescription bottle. Possible side effects of the medication include dizziness, headache, nausea, vomiting, constipation and urinary retention. If you experience any of these side effects call the office so that we can assist you in relieving them. Discontinue the use of the pain medication if you develop itching, rash, shortness of breath or difficulties swallowing. If these symptoms become severe or arent relieved by discontinuing the medication you should seek immediate medical attention. Refills of pain medication are authorized during office hours only (8 AM-5PM Mon. thru Fri.). 2. If you were prescribed Percoset/oxycodone or Dilaudid/hydromorphone you must have a written prescription. These medications legally cannot be called in to the pharmacy. 3. You may take over the counter Ibuprofen/Advil/Aleve between dosages of your pain medication if needed. Do not take Tylenol in addition to your pain medication as most of the pain medication already contains Tylenol. Do not exceed 3000mg of Tylenol per day. Ex: (hydrocodone 5/325g= 325mg of Tylenol) 4. You may resume the medication you were taking prior to surgery.   Pain medication may change the effects of any antidepressant medication you are taking. If you have any questions about possible interactions between your regular medications and the pain medication you should consult the physician who prescribes your regular medications. Follow Up Appointment: If you are unsure of your follow-up appointment date and time, please call [phone]. Please let our  know you are scheduling a post-op appointment. Most appointments should be between 7-14 days after your surgery. Physical Therapy: 
 
[]    If you already have a therapy appointment, please be sure to attend your sessions as scheduled. []   Physical Therapy will be discussed with you at your first follow-up appointment with Dr. Naina Jacques. You dont need to begin physical therapy prior to that. 
 
[]  Begin physical therapy with your Home Health Physical Therapy. This will be set up         for your before you leave the hospital. 
 
[x]  You do not require Physical Therapy. Important Signs and Symptoms: 
 
If any of the following signs and symptoms occurs, you should contact Dr. Naina Jacques office. Please be advised if a problem arises which you feel requires immediate medical attention or you are unable to contact Dr. Naina Jacques office you should seek immediate medical attention. Signs and symptoms to watch for include: 1. A sudden increase in swelling and/or redness or warmth at the area your surgery was performed which isnt relieved by rest, ice, and elevation. 2. Oral temperature greater than 101 degrees for 12 hours or more which isnt relieved by an increase in fluid intake and taking two Tylenol every 4-6 hours. 3. Excessive drainage from your incisions, or drainage which hasnt stopped by 72 hours after your surgery. 4. Fever, chills, shortness of breath, chest pain, nausea, vomiting or other signs and symptoms which are of concern to you. DISCHARGE SUMMARY from Nurse PATIENT INSTRUCTIONS: 
 
 
F-face looks uneven A-arms unable to move or move unevenly S-speech slurred or non-existent T-time-call 911 as soon as signs and symptoms begin-DO NOT go Back to bed or wait to see if you get better-TIME IS BRAIN. Warning Signs of HEART ATTACK Call 911 if you have these symptoms: 
? Chest discomfort. Most heart attacks involve discomfort in the center of the chest that lasts more than a few minutes, or that goes away and comes back. It can feel like uncomfortable pressure, squeezing, fullness, or pain. ? Discomfort in other areas of the upper body. Symptoms can include pain or discomfort in one or both arms, the back, neck, jaw, or stomach. ? Shortness of breath with or without chest discomfort. ? Other signs may include breaking out in a cold sweat, nausea, or lightheadedness. Don't wait more than five minutes to call [address]! Fast action can save your life. Calling 911 is almost always the fastest way to get lifesaving treatment. Emergency Medical Services staff can begin treatment when they arrive  up to an hour sooner than if someone gets to the hospital by car. Patient armband removed and shredded The discharge information has been reviewed with the patient and caregiver. The patient and caregiver verbalized understanding. Discharge medications reviewed with the patient and caregiver and appropriate educational materials and side effects teaching were provided. ___________________________________________________________________________________________________________________________________ Introducing [facility]!    
 [facility] [employer] introduces [facility] patient portal. Now you can access parts of your medical record, email your doctor's office, and request medication refills online. 1. In your internet browser, go to [email or web address] 2. Click on the First Time User? Click Here link in the Sign In box. You will see the New Member Sign Up page. 3. Enter your [employer] Access Code exactly as it appears below. You will not need to use this code after youve completed the sign-up process. If you do not sign up before the expiration date, you must request a new code. · [employer] Access Code: [email or web address] Expires: 8/28/2018  4:55 PM 
 
4. Enter the last four digits of your Social Security Number (xxxx) and Date of Birth (mm/dd/yyyy) as indicated and click Submit. You will be taken to the next sign-up page. 5. Create a [employer] ID. This will be your [employer] login ID and cannot be changed, so think of one that is secure and easy to remember. 6. Create a [employer] password. You can change your password at any time. 7. Enter your Password Reset Question and Answer. This can be used at a later time if you forget your password. 8. Enter your e-mail address. You will receive e-mail notification when new information is available in [address] 9. Click Sign Up. You can now view and download portions of your medical record. 10. Click the Download Summary menu link to download a portable copy of your medical information. If you have questions, please visit the Frequently Asked Questions section of the [employer] website. Remember, [employer] is NOT to be used for urgent needs. For medical emergencies, dial 911. Now available from your iPhone and Android! Introducing Timothy Jara As a [facility] patient, I wanted to make you aware of our electronic visit tool called Timothy Jara. [facility] 24/7 allows you to connect within minutes with a medical provider 24 hours a day, seven days a week via a mobile device or tablet or logging into a secure website from your computer. You can access [employer] from anywhere in the [place]. A virtual visit might be right for you when you have a simple condition and feel like you just dont want to get out of bed, or cant get away from work for an appointment, when your regular [facility] provider is not available (evenings, weekends or holidays), or when youre out of town and need minor care. Electronic visits cost only $49 and if the [facility] 24/7 provider determines a prescription is needed to treat your condition, one can be electronically transmitted to a nearby pharmacy*. Please take a moment to enroll today if you have not already done so. The enrollment process is free and takes just a few minutes. To enroll, please download the [facility] 24/7 je to your tablet or phone, or visit [email or web address] to enroll on your computer. And, as an [address] [place] [address] patient with a [employer] account, the results of your visits will be scanned into your electronic medical record and your primary care provider will be able to view the scanned results. We urge you to continue to see your regular [facility] provider for your ongoing medical care. And while your primary care provider may not be the one available when you seek a S[employer] virtual visit, the peace of mind you get from getting a real diagnosis real time can be priceless. For more information on S[employer], view our Frequently Asked Questions (FAQs) at [email or web address]. Sincerely, 
 
Cesar Galdamez MD 
Chief Medical Officer [address] *:  certain medications cannot be prescribed via [employer] Providers Seen During Your Hospitalization Provider Specialty Primary office phone Edi Aleman, [address] [facility] [phone] Your Primary Care Physician (PCP) Primary Care Physician Office Phone Office Fax [address] [facility] [address] [address], [address] [phone] You are allergic to the following Allergen Reactions Biaxin (Clarithromycin) Hives Shortness of Breath Itching Other Plant, Animal, Environmental Anaphylaxis Bees and most insects-pt has an epipen Cleocin (Clindamycin Hcl) Palpitations Recent Documentation Height Weight BMI OB Status Smoking Status 1.715 m 71.3 kg 24.27 kg/m2 Postmenopausal Current Every Day Smoker Emergency Contacts Name Discharge Info Relation Home Work Mobile [place] DISCHARGE CAREGIVER [3] Father [15]   [phone] Tom Fernandez DISCHARGE CAREGIVER [3] Son [22] [phone] Patient Belongings The following personal items are in your possession at time of discharge: 
  Dental Appliances: Other (comment) (upper denture removed and given to pt's Dad \"BillL)  Visual Aid: Glasses, Sent home (with Bill)      Home Medications: None   Jewelry: None  Clothing: Pants, Shirt, Undergarments, Footwear, Socks (locker 6)    Other Valuables: [employer], Eyeglasses (with [facility]) Please provide this summary of care documentation to your next provider. Signatures-by signing, you are acknowledging that this After Visit Summary has been reviewed with you and you have received a copy. Patient Signature:  ____________________________________________________________ Date:  ____________________________________________________________  
  
Rajni Mejia Provider Signature:  ____________________________________________________________ Date:  ____________________________________________________________

## 2018-05-31 NOTE — DISCHARGE INSTRUCTIONS
Joe Gordon III, MD Zita Michael PA-C    Upper Extremity Surgery  Discharge Instructions      Please take the time to review the following instructions before you leave the hospital and use them as guidelines during your recovery from surgery. If you have any questions you may contact my office at [phone]. Wound Care/Dressing Changes:    []   You may remove the bulky dressing two days after surgery. Once you remove this, no dressing is necessary if there is no drainage. [x]   You may change your dressing as needed. Beginning the 2 days after you are discharged from the hospital you should change your dressing daily. A big, bulky dressing isnt necessary as long as there is any drainage from the incisions. You can put a band-aid or a piece of gauze over each incision and wear an ACE bandage as needed for comfort and swelling. []   Dont remove your dressing or get them wet.  It isnt necessary to apply antibiotic ointment to your incisions. Sutures will be removed at your one week post-op visit. Staples (if you have them) are removed in two weeks. If you have steri-strips over your incision they will start to peel off in 7-10 days as you get them wet. They dont need to be removed prior to that. When they begin to peel off, you may remove them. They should all be removed by 14 days from your surgery. Showering/Bathing:    [x]   You may shower 2 days after your surgery. Your dressing may be removed for showering. You may get your incisions wet in the shower. Dont vigorously scrub your incisions. Apply a clean, dry dressing after you have dried your incisions. Do not take a bath or get into a swimming pool or Jacuzzi until the incisions are completely healed. This may take about 14 days. Do not soak your incision under water. Sling:    []   You are not required to wear your sling and should do so only as needed for comfort.  You have no restrictions with regards to the movement of your shoulder. Please push to achieve full range of motion as soon as possible. You may resume your normal daily activities immediately and return to work as soon as you feel appropriate.    []   Keep your arm in the immobilizer at all times except when showering and changing your clothes. When showering or changing, keep your arm at your side. Dont move it away from your body. []   Keep your arm in the immobilizer at all times except when showering, changing your clothes and doing the exercises shown to you by Dr. Deny Osuna or your physical therapist prior to your discharge from the hospital.  Keep your arm at your side when changing your clothes and showering. Dont move it away from your body. Ice/Elevation    Continue ice consistently for 48 hours after surgery. After 48 hours, you should ice your shoulder 3 times per day, for 20 minutes at a time for the next 5 days. After one week from surgery, you may use ice as needed for pain and swelling. Diet:    You may advance to your regular diet as tolerated. Medication:    1. You will be given a prescription for pain medication when you are discharged from the hospital.  Take the medication as needed according to the directions on the prescription bottle. Possible side effects of the medication include dizziness, headache, nausea, vomiting, constipation and urinary retention. If you experience any of these side effects call the office so that we can assist you in relieving them. Discontinue the use of the pain medication if you develop itching, rash, shortness of breath or difficulties swallowing. If these symptoms become severe or arent relieved by discontinuing the medication you should seek immediate medical attention. Refills of pain medication are authorized during office hours only (8 AM-5PM Mon. thru Fri.).    2. If you were prescribed Percoset/oxycodone or Dilaudid/hydromorphone you must have a written prescription. These medications legally cannot be called in to the pharmacy. 3. You may take over the counter Ibuprofen/Advil/Aleve between dosages of your pain medication if needed. Do not take Tylenol in addition to your pain medication as most of the pain medication already contains Tylenol. Do not exceed 3000mg of Tylenol per day. Ex: (hydrocodone 5/325g= 325mg of Tylenol)  4. You may resume the medication you were taking prior to surgery. Pain medication may change the effects of any antidepressant medication you are taking. If you have any questions about possible interactions between your regular medications and the pain medication you should consult the physician who prescribes your regular medications. Follow Up Appointment:  If you are unsure of your follow-up appointment date and time, please call [phone]. Please let our  know you are scheduling a post-op appointment. Most appointments should be between 7-14 days after your surgery. Physical Therapy:    []    If you already have a therapy appointment, please be sure to attend your sessions as scheduled. []   Physical Therapy will be discussed with you at your first follow-up appointment with Dr. Deny Osuna. You dont need to begin physical therapy prior to that.    []  Begin physical therapy with your Home Health Physical Therapy. This will be set up         for your before you leave the hospital.    [x]  You do not require Physical Therapy. Important Signs and Symptoms:    If any of the following signs and symptoms occurs, you should contact Dr. Deny Osuna office. Please be advised if a problem arises which you feel requires immediate medical attention or you are unable to contact Dr. Deny Osuna office you should seek immediate medical attention. Signs and symptoms to watch for include:    1.  A sudden increase in swelling and/or redness or warmth at the area your surgery was performed which isnt relieved by rest, ice, and elevation. 2. Oral temperature greater than 101 degrees for 12 hours or more which isnt relieved by an increase in fluid intake and taking two Tylenol every 4-6 hours. 3. Excessive drainage from your incisions, or drainage which hasnt stopped by 72 hours after your surgery. 4. Fever, chills, shortness of breath, chest pain, nausea, vomiting or other signs and symptoms which are of concern to you. DISCHARGE SUMMARY from Nurse    PATIENT INSTRUCTIONS:    After general anesthesia or intravenous sedation, for 24 hours or while taking prescription Narcotics:  · Limit your activities  · Do not drive and operate hazardous machinery  · Do not make important personal or business decisions  · Do  not drink alcoholic beverages  · If you have not urinated within 8 hours after discharge, please contact your surgeon on call. Report the following to your surgeon:  · Excessive pain, swelling, redness or odor of or around the surgical area  · Temperature over 100.5  · Nausea and vomiting lasting longer than 4 hours or if unable to take medications  · Any signs of decreased circulation or nerve impairment to extremity: change in color, persistent  numbness, tingling, coldness or increase pain  · Any questions    What to do at Home:  Recommended activity: Activity as tolerated and no driving for today,     If you experience any of the following symptoms as above, please follow up with Dr Ashley Nguyen. *  Please give a list of your current medications to your Primary Care Provider. *  Please update this list whenever your medications are discontinued, doses are      changed, or new medications (including over-the-counter products) are added. *  Please carry medication information at all times in case of emergency situations.     These are general instructions for a healthy lifestyle:    No smoking/ No tobacco products/ Avoid exposure to second hand smoke  Surgeon General's Warning:  Quitting smoking now greatly reduces serious risk to your health. Obesity, smoking, and sedentary lifestyle greatly increases your risk for illness    A healthy diet, regular physical exercise & weight monitoring are important for maintaining a healthy lifestyle    You may be retaining fluid if you have a history of heart failure or if you experience any of the following symptoms:  Weight gain of 3 pounds or more overnight or 5 pounds in a week, increased swelling in our hands or feet or shortness of breath while lying flat in bed. Please call your doctor as soon as you notice any of these symptoms; do not wait until your next office visit. Recognize signs and symptoms of STROKE:    F-face looks uneven    A-arms unable to move or move unevenly    S-speech slurred or non-existent    T-time-call 911 as soon as signs and symptoms begin-DO NOT go       Back to bed or wait to see if you get better-TIME IS BRAIN. Warning Signs of HEART ATTACK     Call 911 if you have these symptoms:   Chest discomfort. Most heart attacks involve discomfort in the center of the chest that lasts more than a few minutes, or that goes away and comes back. It can feel like uncomfortable pressure, squeezing, fullness, or pain.  Discomfort in other areas of the upper body. Symptoms can include pain or discomfort in one or both arms, the back, neck, jaw, or stomach.  Shortness of breath with or without chest discomfort.  Other signs may include breaking out in a cold sweat, nausea, or lightheadedness. Don't wait more than five minutes to call 911 - MINUTES MATTER! Fast action can save your life. Calling 911 is almost always the fastest way to get lifesaving treatment. Emergency Medical Services staff can begin treatment when they arrive -- up to an hour sooner than if someone gets to the hospital by car. Patient armband removed and shredded  The discharge information has been reviewed with the patient and caregiver.   The patient and caregiver verbalized understanding. Discharge medications reviewed with the patient and caregiver and appropriate educational materials and side effects teaching were provided.   ___________________________________________________________________________________________________________________________________

## 2018-05-31 NOTE — OP NOTES
Endoscopic Carpal Tunnel Release    Patient: Marina Ramirez MRN: [id number]  CSN: [id number]   YOB: 1965  Age: 48 y.o. Sex: female      Date of Surgery:5/31/2018    PREOPERATIVE DIAGNOSES:  LEFT CARPAL TUNNEL SYNDROME      POSTOPERATIVE DIAGNOSES:  LEFT CARPAL TUNNEL SYNDROME    PROCEDURE: left Single Incision Endoscpoic Carpal Tunnel Release    SURGEON: Paco Singh MD    SPECIMEN TO PATHOLOGY:  * No specimens in log *    ESTIMATED BLOOD LOSS: none    FINDINGS:  Same     TOURNIQUET  TIME:   Approximately 5 minutes at 339 Hg     COMPLICATIONS:  None     ANESTHESIA:  General    INDICATIONS:  A 48y.o. year old female with known carpal tunnel syndrome documented by clinical exam and nerve conduction studies. The patient now presents for a ECTR. PROCEDURE: The patient was brought into the operating theater and after adequate prepping and draping of the surgical wrist and hand the limb was then exsanguinated with an Esmarch bandage and the tourniquet was inflated. .  A 1 cm transverse incision was placed at the volar flexion wrist crease just ulnar to the palmaris longus. The incision was deepened to the antebrachial fascia which was released the length of the wound and proximally for an additional 1 cm. Using the [employer] carpal tunnel release kit, the carpal tunnel was dilated in line with the fourth metacarpal to the distal extent of the transverse carpal ligament. The cannula was then inserted and kept on some proximal radial deviation with the wrist in slight extension, and it was passed all the way to the cardinal line of Koo. The scope was then inserted with the wrist in slight extension and the undersurface of the transverse carpal ligament was seen easily from proximal to the distal fatty/adipose tissue at the end of the transverse carpal ligament.   Using the [facility] on the [employer] and using the endoscope for visualization, the transverse carpal ligament was transected in two passes with good release of the ligament outside of view of the cannula. The nerve was never in harms way. The cannula was then withdrawn and placed back in the carpal tunnel with none of the prerelease tension. The wound was then closed with Mastisol on either side and then Steri-Strips were used to re-oppose the skin without the use of stitches. The skin and cut ends of the transverse carpal ligament were anesthetized with 4 to 5 mL of 0.25% Marcaine without epinephrine. This was dressed with two 4 x 4s and an Ace wrap. The tourniquet was deflated and removed, and the patient taken to the recovery room awake and in stable condition. All instrument, sponge and needle counts were correct.     Ish Lundy MD  5/31/2018

## 2018-05-31 NOTE — INTERVAL H&P NOTE
H&P Update:  Rony Strong was seen and examined. History and physical has been reviewed. The patient has been examined. There have been no significant clinical changes since the completion of the originally dated History and Physical.  Patient identified by surgeon; surgical site was confirmed by patient and surgeon.     Signed By: Jenni Villarreal MD     May 31, 2018 11:26 AM

## 2018-05-31 NOTE — PERIOP NOTES
TRANSFER - IN REPORT:    Verbal report received from ORN and CRNA(name) on Baljinder To  being received from OR(unit) for routine progression of care      Report consisted of patients Situation, Background, Assessment and   Recommendations(SBAR). Information from the following report(s) SBAR, Kardex, OR Summary, Procedure Summary, MAR and Recent Results was reviewed with the receiving nurse. Opportunity for questions and clarification was provided. Assessment completed upon patients arrival to unit and care assumed.

## (undated) DEVICE — NEEDLE HYPO 25GA L1.5IN BVL ORIENTED ECLIPSE

## (undated) DEVICE — BANDAGE COMPR W4INXL5YD ELAS CLP CLSR

## (undated) DEVICE — (D)STRIP SKN CLSR 0.5X4IN WHT --

## (undated) DEVICE — CTS RELIEF KIT, CARPAL TUNNEL SYNDROME RELIEF KIT: Brand: CTS RELIEF KIT

## (undated) DEVICE — REM POLYHESIVE ADULT PATIENT RETURN ELECTRODE: Brand: VALLEYLAB

## (undated) DEVICE — PACK PROCEDURE SURG EXTREMITY CUST

## (undated) DEVICE — STERILE POLYISOPRENE POWDER-FREE SURGICAL GLOVES: Brand: PROTEXIS

## (undated) DEVICE — MASTISOL ADHESIVE LIQ 2/3ML

## (undated) DEVICE — ZIMMER® STERILE DISPOSABLE TOURNIQUET CUFF WITH PROTECTIVE SLEEVE AND PLC, SINGLE PORT, SINGLE BLADDER, 18 IN. (46 CM)

## (undated) DEVICE — STERILE POLYISOPRENE POWDER-FREE SURGICAL GLOVES WITH EMOLLIENT COATING: Brand: PROTEXIS

## (undated) DEVICE — (D)PREP SKN CHLRAPRP APPL 26ML -- CONVERT TO ITEM 371833